# Patient Record
Sex: MALE | Race: WHITE | URBAN - METROPOLITAN AREA
[De-identification: names, ages, dates, MRNs, and addresses within clinical notes are randomized per-mention and may not be internally consistent; named-entity substitution may affect disease eponyms.]

---

## 2020-01-22 ENCOUNTER — OFFICE VISIT (OUTPATIENT)
Dept: PRIMARY CARE CLINIC | Age: 25
End: 2020-01-22
Payer: COMMERCIAL

## 2020-01-22 VITALS
DIASTOLIC BLOOD PRESSURE: 69 MMHG | BODY MASS INDEX: 23.04 KG/M2 | OXYGEN SATURATION: 97 % | HEIGHT: 68 IN | RESPIRATION RATE: 16 BRPM | HEART RATE: 50 BPM | WEIGHT: 152 LBS | SYSTOLIC BLOOD PRESSURE: 118 MMHG

## 2020-01-22 PROCEDURE — 99213 OFFICE O/P EST LOW 20 MIN: CPT | Performed by: NURSE PRACTITIONER

## 2020-01-22 RX ORDER — PANTOPRAZOLE SODIUM 40 MG/1
TABLET, DELAYED RELEASE ORAL
COMMUNITY
End: 2020-01-22

## 2020-01-22 RX ORDER — DEXLANSOPRAZOLE 60 MG/1
CAPSULE, DELAYED RELEASE ORAL
COMMUNITY
End: 2020-01-22

## 2020-01-22 RX ORDER — AMOXICILLIN AND CLAVULANATE POTASSIUM 875; 125 MG/1; MG/1
1 TABLET, FILM COATED ORAL 2 TIMES DAILY
Qty: 14 TABLET | Refills: 0 | Status: SHIPPED | OUTPATIENT
Start: 2020-01-22 | End: 2020-01-29

## 2020-01-22 RX ORDER — FLUTICASONE PROPIONATE 50 MCG
SPRAY, SUSPENSION (ML) NASAL
COMMUNITY

## 2020-01-22 RX ORDER — EPINEPHRINE 0.3 MG/.3ML
INJECTION SUBCUTANEOUS
COMMUNITY

## 2020-01-22 SDOH — HEALTH STABILITY: MENTAL HEALTH: HOW OFTEN DO YOU HAVE A DRINK CONTAINING ALCOHOL?: NEVER

## 2020-02-04 ENCOUNTER — OFFICE VISIT (OUTPATIENT)
Dept: PRIMARY CARE CLINIC | Age: 25
End: 2020-02-04
Payer: COMMERCIAL

## 2020-02-04 VITALS
WEIGHT: 152 LBS | HEIGHT: 68 IN | BODY MASS INDEX: 23.04 KG/M2 | DIASTOLIC BLOOD PRESSURE: 72 MMHG | OXYGEN SATURATION: 99 % | TEMPERATURE: 98.9 F | HEART RATE: 69 BPM | SYSTOLIC BLOOD PRESSURE: 122 MMHG

## 2020-02-04 LAB — S PYO AG THROAT QL: NORMAL

## 2020-02-04 PROCEDURE — 87880 STREP A ASSAY W/OPTIC: CPT | Performed by: NURSE PRACTITIONER

## 2020-02-04 PROCEDURE — 99213 OFFICE O/P EST LOW 20 MIN: CPT | Performed by: NURSE PRACTITIONER

## 2020-02-04 NOTE — PROGRESS NOTES
Chief Complaint:   Pharyngitis (was seen 1/22/20 and took ABX for 3 days) and Nasal Congestion      History of Present Illness   Source of history provided by:  patient. Rosemary Laura is a 25 y.o. old male with a past medical history of: History reviewed. No pertinent past medical history. Presents to the [facility] care for evaluation of nasal congestion, nasal drainage, bilateral ear pressure, mild productive cough and sore throat for the past 14 days. Has been taking sudafed OTC without relief. Denies any fever, chills, wheezing, CP, SOB, or GI symptoms. He reports that he initially took sudafed for a couple of days and then filled the script for Augmentin. He was unable to tolerate the Augmentin due to side effects. Denies any hx of asthma, COPD, or tobacco use. ROS    Unless otherwise stated in this report or unable to obtain because of the patient's clinical or mental status as evidenced by the medical record, this patients's positive and negative responses for Review of Systems, constitutional, psych, eyes, ENT, cardiovascular, respiratory, gastrointestinal, neurological, genitourinary, musculoskeletal, integument systems and systems related to the presenting problem are either stated in the preceding or were not pertinent or were negative for the symptoms and/or complaints related to the medical problem. Past Surgical History:  has no past surgical history on file. Social History:  reports that he has never smoked. He has never used smokeless tobacco. He reports that he does not drink alcohol or use drugs. Family History: family history is not on file. Allergies: Patient has no known allergies. Physical Exam         VS:  /72 (Site: Right Upper Arm, Position: Sitting)   Pulse 69   Temp 98.9 °F (37.2 °C)   Ht 5' 8\" (1.727 m)   Wt 152 lb (68.9 kg)   SpO2 99%   BMI 23.11 kg/m²    Oxygen Saturation Interpretation: Normal.    Constitutional:  Alert, development consistent with age.   Ears: